# Patient Record
Sex: FEMALE | Race: WHITE
[De-identification: names, ages, dates, MRNs, and addresses within clinical notes are randomized per-mention and may not be internally consistent; named-entity substitution may affect disease eponyms.]

---

## 2020-02-20 ENCOUNTER — HOSPITAL ENCOUNTER (EMERGENCY)
Dept: HOSPITAL 25 - UCEAST | Age: 25
Discharge: HOME | End: 2020-02-20
Payer: COMMERCIAL

## 2020-02-20 VITALS — DIASTOLIC BLOOD PRESSURE: 72 MMHG | SYSTOLIC BLOOD PRESSURE: 107 MMHG

## 2020-02-20 DIAGNOSIS — Z13.6: Primary | ICD-10-CM

## 2020-02-20 DIAGNOSIS — I83.91: ICD-10-CM

## 2020-02-20 PROCEDURE — 99211 OFF/OP EST MAY X REQ PHY/QHP: CPT

## 2020-02-20 PROCEDURE — G0463 HOSPITAL OUTPT CLINIC VISIT: HCPCS

## 2020-02-20 NOTE — UC
UC General HPI





- HPI Summary


HPI Summary: 


Patient is a 24-year-old female presenting with concern for DVT.  Patient 

states her OB sent her here after she called with concern for her varicose 

veins "feeling warm this morning."  Denies erythema.  Denies bleeding.  Denies 

edema.  Patient states she has had varicose veins since being pregnant.  Has 

never had a DVT or blood clot elsewhere in the past.  States the warm feeling 

has since dissipated and "really only happens when she stands for too long."








- History of Current Complaint


Chief Complaint: UCLowerExtremity


Stated Complaint: POSS DVT


Hx Obtained From: Patient


Pain Intensity: 3





- Allergy/Home Medications


Allergies/Adverse Reactions: 


 Allergies











Allergy/AdvReac Type Severity Reaction Status Date / Time


 


No Known Allergies Allergy   Verified 20 15:03











Home Medications: 


 Home Medications





Pnv No.95/Ferrous Fum/Folic AC [Prenatal Tablet] 1 tab DAILY 20 [History 

Confirmed 20]











PMH/Surg Hx/FS Hx/Imm Hx


Previously Healthy: Yes





- Surgical History


Surgical History: Yes


Surgery Procedure, Year, and Place: .  Tonsil





- Family History


Known Family History: Positive: Non-Contributory





- Social History


Alcohol Use: None


Substance Use Type: None


Smoking Status (MU): Never Smoked Tobacco





Review of Systems


All Other Systems Reviewed And Are Negative: No


Constitutional: Positive: Negative


Skin: Positive: Other - "warm feeling of varicose veins"


Respiratory: Positive: Negative.  Negative: Shortness Of Breath


Cardiovascular: Positive: Negative.  Negative: Palpitations, Chest Pain


Gastrointestinal: Positive: Negative


Neurovascular: Positive: Negative


Musculoskeletal: Positive: Negative





Physical Exam





- Summary


Physical Exam Summary: 


Vital Signs Reviewed: Yes


A+Ox3, no distress


Eyes: Conjunctiva Clear


ENT: Hearing grossly normal


Neck: Positive: Supple


Respiratory: Positive: No respiratory distress, No accessory muscle use + CTA 

throughout  no w/r


Cardiovascular: RRR  nl s1, s2  no m/r  CBT <2  sec


Musculoskeletal Exam: DELGADILLO x 4 without difficulty, no edema, ROM intact, 

negative hernan's sign


Neurological: Positive: Alert, sensation grossly intact


Psychological: Positive: age appropriate behavior


Skin: Positive: superficial varicose veins noted of right lower extremity, no 

erythema or warmth appreciated, no TTP, no bleeding, no rash, no ecchymosis








Vital Signs: 


 Initial Vital Signs











Temp  97.6 F   20 15:04


 


Pulse  88   20 15:04


 


Resp  16   20 15:04


 


BP  107/72   20 15:04


 


Pulse Ox  98   20 15:04














Diagnostics





- Radiology


  ** vein LE


Radiology Interpretation Completed By: Radiologist


Summary of Radiographic Findings: IMPRESSION: NO RIGHT LOWER EXTREMITY DEEP 

VEIN THROMBOSIS. SUBCUTANEOUS VARICOSITIES ALONG THE RIGHT KNEE





Course/Dx





- Course


Course Of Treatment: 


Discussed the negative ultrasound with patient.  Educated on varicose veins and 

signs and symptoms of DVTs.  Instructed to follow up with PCP if varicose veins 

continued to bother her.  Patient voiced understanding and agreed with plan.








- Diagnoses


Provider Diagnosis: 


 Varicose veins of right lower extremity, Encounter for assessment for deep 

vein thrombosis (DVT)








Discharge ED





- Sign-Out/Discharge


Documenting (check all that apply): Patient Departure


All imaging exams completed and their final reports reviewed: Yes





- Discharge Plan


Condition: Stable


Disposition: HOME


Referrals: 


[facility] PHYSICIAN REFERRAL [Outside] - If Needed


Additional Instructions: 


As discussed, your ultrasound was negative for a DVT today.


You may apply cold compresses to the areas if needed.


Follow up with the physician referral if your varicose veins continue to bother 

you.


Return if you notice any redness, swelling, or bleeding from the area.





- Billing Disposition and Condition


Condition: STABLE


Disposition: Home

## 2020-02-20 NOTE — XMS REPORT
Continuity of Care Document (CCD)

 Created on:2020



Patient:Chad Ramirez

Sex:Female

:1995

External Reference #:[id number]





Demographics







 Address  [address] [place] [address]

 

 Home Phone  [phone]

 

 Preferred Language  en

 

 Marital Status  Not  or 

 

 Oriental orthodox Affiliation  Unknown

 

 Race  White

 

 Ethnic Group  Not  or 









Author







 Name  Codie Tomlin CNM

 

 Address  [address] [place] [id number]









Problems







 Active Problems  Provider  Date

 

 H/O:  section  Codie Tomlin CNM  Onset: 2020

 

 Multigravida  Codie Tomlin CNM  Onset: 2020

 

 Varicose veins of lower extremity  Codie Tomlin CNM  Onset: 2020







Social History







 Type  Date  Description  Comments

 

 Birth Sex    Unknown  

 

 Tobacco Use  Start: Unknown  Never Smoked Cigarettes  

 

 ETOH Use    Does Not Drink Alcohol  

 

 Recreational Drug Use    Does Not Use Drugs  

 

 Tobacco Use  Start: Unknown  Patient has never smoked  

 

 Smoking Status  Reviewed: 20  Patient has never smoked  







Allergies, Adverse Reactions, Alerts







 Description

 

 No Known Drug Allergies







Medications







 Active Medications  SIG  Qnty  Indications  Ordering Provider  Date

 

 Breast Pump  double electric  1units  Z39.1  Vesta Oneal  2020



             Misc  breast pump for      ELINA Fierro  



   lactating mother        

 

 Prenatal Dha  1 by mouth every      Unknown  



             200mg  day, ok to use        



 Capsules  200-400mg dha        



           









 History Medications









 Breast Pump  double electric  1units  Z39.1  Vesta Oneal  2019 -



   breast pump for      ELINA Fierro  2020



 Misc  lactating mother        



           







Medications Administered in Office







 Medication  SIG  Qnty  Indications  Ordering Provider  Date

 

 PT SCRN Tbco Id as Non User        Codie Tomlin CNM  2020



              Injection          



           







Immunizations







 Description

 

 No Information Available







Vital Signs







 Date  Vital  Result  Comment

 

 2020  1:49pm  BP Systolic  116 mmHg  









 BP Diastolic  60 mmHg  

 

 Heart Rate  72 /min  

 

 Respiratory Rate  16 /min  

 

 Height  67 inches  5'7"

 

 Weight  217.00 lb  

 

 BMI (Body Mass Index)  34.0 kg/m2  

 

 Last Menstrual Period  [id number]  









 2019 12:54pm  BP Systolic  118 mmHg  









 BP Diastolic  72 mmHg  

 

 Height  67 inches  5'7"

 

 Weight  200.00 lb  

 

 BMI (Body Mass Index)  31.3 kg/m2  

 

   2  

 

 Parity  1  







Results







 Test  Acquired Date  Facility  Test  Result  H/L  Range  Note

 

 Prenatal PNL No  2019  [facility]  Rubella Screen  Equivocal 
   Immune  1



 [place] [address]          



     [phone]          









 Hemoglobin A1c  5.0 %  Normal  4.0-5.6  2

 

 Hepatitis B Surface Ag  Nonreactive    Nonreactive  3

 

 Syphillis Igg W/Reflex RPR  Negative    Negative  4









 CBC With No  2019  [facility]  White Blood  11.2 10^3/uL  
High  3.5-10.8  



 [place] [address]  Count        



     [phone]          









 Red Blood Count  4.12 10^6/uL  Normal  3.70-4.87  

 

 Hemoglobin  12.3 g/dL  Normal  12.0-16.0  

 

 Hematocrit  37 %  Normal  35-47  

 

 Mean Corpuscular Volume  90 fL  Normal  80-97  

 

 Mean Corpuscular Hemoglobin  30 pg  Normal  27-31  

 

 Mean Corpuscular HGB Conc  33 g/dL  Normal  31-36  

 

 Red Cell Distribution Width  14 %  Normal  10-15  

 

 Platelet Count  330 10^3/uL  Normal  150-450  

 

 Mean Platelet Volume  9.2 fL  Normal  7.4-10.4  









 Type And Screen  2019  [facility]  Patient Blood Type  A 
Positive      



     [place] [address]          



     [phone]          









 Antibody Screen  NEGATIVE      









 Lead  2019  [facility]  Lead,Venous, B  < 1.0 g/dL    0.0-
4.9  5



     [place] [address]          



     [phone]          









 Venous/Capillary  Venous      

 

 Submitting Laboratory Phone  [phone]      6









 HIV 1&2 p24  2019  [facility]  HIV 4th  Nonreactive    
Nonreactive  



 [place] [address]  Generation        



     [phone]          

 

 Varicella  2019  [facility]  Varicella-Zost  Negative      7



 [place] [address]  er IgG        



     [phone]  Antibody        









 Varicella IgG Antibody Index  0.8      8









 GC/Chlamydia Dna  2019  [facility]  [facility] Disclaimer  (SEE 
NOTE)      9



 [place] [address]          



     [phone]          









 Chlamydia trachomatis Lisa  Negative    Negative  

 

 Neisseria gonorrhoeae (GC) Lisa  Negative    Negative  









 Drug Screen  2019  [facility]  Urine  None Detected    None 
Detect  



 [place] [address]  Hydrocodone        



 [facility]    [phone]  Screen        









 Urine Oxycodone Screen  None Detected    None Detect  

 

 Urine Fentanyl Screen  None Detected    None Detect  

 

 Urine Methadone Screen  None Detected    None Detect  

 

 Urine Buprenorphine Screen  None Detected    None Detect  

 

 Urine Amphetamine Screen  None Detected    None Detect  

 

 Urine Barbiturates Screen  None Detected    None Detect  

 

 Urine Benzodiazepine Screen  None Detected    None Detect  

 

 Urine Cannabinoids Screen  None Detected    None Detect  

 

 Urine Cocaine Screen  None Detected    None Detect  

 

 Urine Opiates Screen  None Detected    None Detect  

 

 Urine Phencyclidine Screen  None Detected    None Detect  10









 Urine Culture And  2019  [facility]  Urine Culture  SEE 
RESULT      11



 [place] [address]    [facility]      



     [phone]          









 1  [id number]

 

 2  Therapeutic target for the treatment of diabetes



   mellitus patients is <7% HBA1C, and in selective



   patients <6.0%.  Please refer to American Diabetes



   Association diabetic care guidelines for further



   information.

 

 3  [id number]

 

 4  [id number]

 

 5  -------------------ADDITIONAL INFORMATION-------------------



   Testing performed by Inductively Coupled Plasma-Mass



   Spectrometry (ICP-MS).



   This test was developed and its performance characteristics



   determined by [facility] in a manner consistent with CLIA



   requirements. This test has not been cleared or approved by



   the U.S. Food and Drug Administration.

 

 6  Test Performed by:



   [facility] [place] [address]



   : Yasmani Riddle M.D. Ph.D.; CLIA# [id number]

 

 7  -------------------REFERENCE VALUE--------------------------



   Vaccinated: Positive (>=1.1 AI)



   Unvaccinated: Negative (<=0.8 AI)

 

 8  Test Performed by:



   [place] [address]



   : Yasmani Riddle M.D. Ph.D.; CLIA# [id number]

 

 9  As with all diagnostic procedures, the laboratory results



   obtained should be used in conjunction with other clinical



   information available to the physician, including



   confirmation by another method, as applicable.

 

 10  -----------------------------------------------------------



   The specimen was tested at the listed cutoffs:



   -----------------------------------------------------------



   Drug Class                       Test level (ng/mL)



   -----------                      ------------------



   Hydrocodone                         300



   Oxycodone                           100



   Fentanyl                              1



   Methadone                           150



   Buprenorphine                         5



   Amphetamines                        500



   Barbiturates                        200



   Benzodiazepines                     200



   Cocaine                             150



   Cannabinoids                         50



   Opiates                             300



   PCP                                  25



   



   **Specimen was received without chain of custody. Results



   should be used for medical purposes only.**

 

 11  SEE RESULT BELOW



   -----------------------------------------------------------------------------
---------------



   Name:  CHAD RAMIREZ                  : 1995    Attend Dr: Lu Null [facility]



   Acct:  [id number]  Unit: [id number]  AGE: 24            Location:  [facility]



   Re19                        SEX: F             Status:    REG REF



   -----------------------------------------------------------------------------
---------------



   



   SPEC: [id number]       CARL:     [facility] DR: Lu Null CNM



   REQ:  [id number]            RECD:  



   STATUS:COMP



   _



   SOURCE: URINE          SPDESC:



   ORDERED:  Urine Culture



   COMMENTS: [id number]



   Urine Source: Random



   



   -----------------------------------------------------------------------------
---------------



   Procedure                         Result                         Reported   
        Site



   -----------------------------------------------------------------------------
---------------



   



   Urine Culture  Final                                             944      ML



   



   No growth of clinically significant organisms



   



   -----------------------------------------------------------------------------
---------------



   * ML - Main Lab



   .



   



   



   



   



   



   



   



   



   



   



   



   



   



   



   



   



   



   



   



   



   



   



   ** END OF REPORT **



   



   DEPARTMENT OF PATHOLOGY,  [address] [place] [address]



   Phone # [phone]      Fax #[phone]



   Ino Nicolas M.D. Director     [facility] # [id number]







Procedures







 Date  Code  Description  Status

 

 2020  48789  Echography Pregnant Uterus Complete  Completed

 

 2019  30026  OB Ultrasound First Trimester  Completed

 

 2019  24525  OB Ultrasound First Trimester  Completed







Medical Devices







 Description

 

 No Information Available







Encounters







 Type  Date  Location  Provider  Dx  Diagnosis

 

 Office Visit  2020  [facility]  Codie Tomlin CNM  I83.011  Varicose 
veins of



   1:45p        right lower



           extremity with



           ulcer of thigh









 I83.018  Varicose veins of r low extrem w ulcer oth part of lower leg

 

 O34.211  Matern care for low transverse scar from prev  del







Assessments







 Date  Code  Description  Provider

 

 2020  I83.011  Varicose veins of right lower extremity  Codie Tomlin CNM



     with ulcer of thigh  

 

 2020  I83.018  Varicose veins of right lower extremity  Codie Tomlin CNM



     with ulcer other part of lower leg  

 

 2020  O34.211  Maternal care for low transverse scar  Codie Tomlin CNM



     from previous  delivery  

 

 2020  Z34.82  Encounter for supervision of other normal  Vesta Fierro, JOSE



     pregnancy, second trimester  

 

 2020  Z36.3  Encounter for  screening for  Radha Kasper MD



     malformations  

 

 2020  Z34.82  Encounter for supervision of other normal  Yesi Vences, 
[facility]



     pregnancy, second trimester  

 

 2020  Z36.3  Encounter for  screening for  Ultrasounds



     malformations  

 

 2019  Z36.9  Encounter for  screening,  Antolin Sánchez M.D.



     unspecified  

 

 2019  Z36.9  Encounter for  screening,  Laboratory



     unspecified  

 

 2019  Z34.82  Encounter for supervision of other normal  Dockristine Mccabe, 
[facility]



     pregnancy, second trimester  

 

 2019  Z34.81  Encounter for supervision of other normal  Lu Tennille
, [facility]



     pregnancy, first trimester  

 

 2019  O26.91  Pregnancy related conditions,  Francheska Brennan MD



     unspecified, first trimester  

 

 2019  O26.91  Pregnancy related conditions,  Ultrasounds



     unspecified, first trimester  







Plan of Treatment

Future Appointment(s):2020  3:00 pm - Francheska Brennan MD at [facility]2020  3:30 pm - Codie Tomlin CNM at [facility] [id number]  2:30 pm 
- Laboratory at [facility]2020 - JOSE FaithMI83.011 Varicose veins 
of right lower extremity with ulcer of thighNew Xrays:Ultrasound,Venous Doppler 
Studies,Unilateral Right, Ordered: 20Comments:I have ordered an 
ultrasound study of your leg to rule out a blood clot. We will follow-up with 
you based on the xwohznB93.018 Varicose veins of right lower extremity with 
ulcer other part of lower legNew Xrays:Ultrasound,Venous Doppler Studies,
Unilateral Right, Ordered: 20Comments:I have ordered an ultrasound study 
of your leg to rule out a blood clot. We will follow-up with you based on the 
xvkfwlC41.211 Maternal care for low transverse scar from previous  
deliveryComments:Follow-up as scheduled for routine OB care



Functional Status







 Description

 

 No Information Available







Mental Status







 Description

 

 No Information Available







Referrals







 Description

 

 No Information Available